# Patient Record
Sex: FEMALE | Race: WHITE | Employment: OTHER | URBAN - METROPOLITAN AREA
[De-identification: names, ages, dates, MRNs, and addresses within clinical notes are randomized per-mention and may not be internally consistent; named-entity substitution may affect disease eponyms.]

---

## 2019-10-19 ENCOUNTER — OFFICE VISIT (OUTPATIENT)
Dept: URGENT CARE | Facility: URGENT CARE | Age: 84
End: 2019-10-19
Payer: MEDICARE

## 2019-10-19 VITALS
SYSTOLIC BLOOD PRESSURE: 172 MMHG | RESPIRATION RATE: 18 BRPM | HEART RATE: 68 BPM | DIASTOLIC BLOOD PRESSURE: 89 MMHG | OXYGEN SATURATION: 95 % | TEMPERATURE: 97.9 F | WEIGHT: 105 LBS

## 2019-10-19 DIAGNOSIS — R06.02 SOB (SHORTNESS OF BREATH): ICD-10-CM

## 2019-10-19 DIAGNOSIS — R94.31 ABNORMAL ELECTROCARDIOGRAM: ICD-10-CM

## 2019-10-19 DIAGNOSIS — R05.9 COUGH: ICD-10-CM

## 2019-10-19 DIAGNOSIS — J44.9 CHRONIC OBSTRUCTIVE PULMONARY DISEASE, UNSPECIFIED COPD TYPE (H): ICD-10-CM

## 2019-10-19 DIAGNOSIS — I25.2 OLD MYOCARDIAL INFARCTION: ICD-10-CM

## 2019-10-19 DIAGNOSIS — R53.83 FATIGUE, UNSPECIFIED TYPE: Primary | ICD-10-CM

## 2019-10-19 DIAGNOSIS — R03.0 ELEVATED BLOOD PRESSURE READING WITHOUT DIAGNOSIS OF HYPERTENSION: ICD-10-CM

## 2019-10-19 LAB
DEPRECATED S PYO AG THROAT QL EIA: NORMAL
SPECIMEN SOURCE: NORMAL

## 2019-10-19 PROCEDURE — 94640 AIRWAY INHALATION TREATMENT: CPT | Performed by: PHYSICIAN ASSISTANT

## 2019-10-19 PROCEDURE — 99205 OFFICE O/P NEW HI 60 MIN: CPT | Mod: 25 | Performed by: PHYSICIAN ASSISTANT

## 2019-10-19 PROCEDURE — 87880 STREP A ASSAY W/OPTIC: CPT | Performed by: PHYSICIAN ASSISTANT

## 2019-10-19 PROCEDURE — 87081 CULTURE SCREEN ONLY: CPT | Performed by: PHYSICIAN ASSISTANT

## 2019-10-19 PROCEDURE — 93000 ELECTROCARDIOGRAM COMPLETE: CPT | Performed by: PHYSICIAN ASSISTANT

## 2019-10-19 RX ORDER — ASPIRIN 81 MG/1
1 TABLET, CHEWABLE ORAL
COMMUNITY

## 2019-10-19 RX ORDER — AZITHROMYCIN 250 MG/1
TABLET, FILM COATED ORAL
Refills: 0 | COMMUNITY
Start: 2019-10-08

## 2019-10-19 RX ORDER — IPRATROPIUM BROMIDE AND ALBUTEROL SULFATE 2.5; .5 MG/3ML; MG/3ML
1 SOLUTION RESPIRATORY (INHALATION) EVERY 4 HOURS PRN
Qty: 30 VIAL | Refills: 1 | Status: SHIPPED | OUTPATIENT
Start: 2019-10-19

## 2019-10-19 RX ORDER — ATORVASTATIN CALCIUM 20 MG/1
20 TABLET, FILM COATED ORAL DAILY
Refills: 1 | COMMUNITY
Start: 2019-07-10

## 2019-10-19 NOTE — PATIENT INSTRUCTIONS
EKG shows  Anteroseptal infarct age undetermined. I do not have actual EKG to compare it to. She did have a MI in the 1990's, note says non Q wave. This could be COPD flare but I can't do troponin or BNP here to rule out others causes.    Duoneb given here.

## 2019-10-19 NOTE — PROGRESS NOTES
S: 83-year-old female presents for evaluation of fatigue, shortness of breath.  She states she has a mild cough.  She states she feels like she has congestion in her chest but nothing is coming up.  She has had a moderate sore throat for the last week.  She has a history of hyperlipidemia and had a normal stress test in 2013.  She has a history of MI, non-Q wave in the 1990s which was treated at [facility].  She had a cardiac catheterization 1989 with normal coronary arteries.  In September 2019 she was seen by cardiology for left arm pain.  The note says she had an EKG that showed normal sinus rhythm with no acute ischemic or arrhythmic changes.  Her arm pain was felt to be musculoskeletal in origin.  No leg swelling or edema.  She states the symptoms bothers her the most is her fatigue and tiredness.    Social-non-smoker    10/7 zpack and 5 days of Prednisone. CXR- no pneumonia, COPD  10/17- CBC, comp met panel.  Given Famotodine.      Allergies not on file    Past medical history-prior MI.  See above  Family medical history-Father with heart failure.  Mother with aneurysm.    No current outpatient medications on file prior to visit.  No current facility-administered medications on file prior to visit.       Social History     Tobacco Use     Smoking status: Not on file   Substance Use Topics     Alcohol use: Not on file       ROS:  CONSTITUTIONAL: Negative for fatigue or fever.  EYES: Negative for eye problems.  ENT: As above.  RESP: As above.  CV: Negative for chest pains.  GI: Negative for vomiting.  MUSCULOSKELETAL:  Negative for significant muscle or joint pains.  NEUROLOGIC: Negative for headaches.  SKIN: Negative for rash.  PSYCH: Normal mentation for age.    OBJECTIVE:  BP (!) 172/89 (BP Location: Left arm, Patient Position: Chair, Cuff Size: Adult Regular)   Pulse 68   Temp 97.9  F (36.6  C) (Oral)   Resp 18   Wt 47.6 kg (105 lb)   SpO2 95%     GENERAL APPEARANCE: Healthy, alert and no  distress.  EYES:Conjunctiva/sclera clear.  EARS: No cerumen.   Ear canals w/o erythema.  TM's intact w/o erythema.    NOSE/MOUTH: Nose without ulcers, erythema or lesions.  SINUSES: No maxillary sinus tenderness.  THROAT: Mild  erythema w/o tonsillar enlargement . No exudates.  NECK: Supple, nontender, no lymphadenopathy.  RESP: Lungs clear to auscultation - no rales, rhonchi or wheezes but tight. After neb much better air movement   CV: Regular rate and rhythm, normal S1 S2, no murmur noted.  NEURO: Awake, alert    SKIN: No rashes abdomen-soft, nontender.    Results for orders placed or performed in visit on 10/19/19   Rapid strep screen   Result Value Ref Range    Specimen Description Throat     Rapid Strep A Screen       NEGATIVE: No Group A streptococcal antigen detected by immunoassay, await culture report.           EXAM: PA and lateral chest x-ray dated 10/7/2019 3:11 PM    CLINICAL DATA: Shortness of breath.    COMPARISON: PA and lateral chest from 9/20/2017.    VIEWS: PA and lateral views of the chest.    FINDINGS: The lungs are clear bilaterally. Lungs are mildly hyperinflated with increased AP diameter. The cardiac silhouette and mediastinal contours are normal. No pleural effusion. There is no pneumothorax. Osteopenia with mild degenerative changes thoracic spine.    IMPRESSION:    No acute pulmonary disease. Hyperinflation consistent with underlying COPD.    REPORT SIGNED BY DR. Navneet Ohara        EKG-rate 63.  I see age-indeterminate anterior septal infarct         ASSESSMENT:     ICD-10-CM    1. Fatigue, unspecified type R53.83 Rapid strep screen     EKG 12-lead complete w/read - Clinics     INHALATION/NEBULIZER TREATMENT, INITIAL     ipratropium - albuterol 0.5 mg/2.5 mg/3 mL (DUONEB) 0.5-2.5 (3) MG/3ML neb solution     Beta strep group A culture   2. SOB (shortness of breath) R06.02 Rapid strep screen     EKG 12-lead complete w/read - Clinics     INHALATION/NEBULIZER TREATMENT, INITIAL      ipratropium - albuterol 0.5 mg/2.5 mg/3 mL (DUONEB) 0.5-2.5 (3) MG/3ML neb solution     Beta strep group A culture     ALBUTEROL/IPRATROPIUM 3ML NEB   3. Chronic obstructive pulmonary disease, unspecified COPD type (H) J44.9    4. Cough R05    5. Elevated blood pressure reading without diagnosis of hypertension R03.0    6. Abnormal electrocardiogram R94.31    7. Old myocardial infarction I25.2            PLAN: This likely is COPD exacerbation.  EKG is abnormal.  However I do not have a prior EKG to compare it to.  She did have an MI in the 90s, notes state non-Q wave.  I am unable to do troponin or BNP here to rule out more serious things.  She feels that the DuoNeb did help here.  She will go to [place] [facility] for further evaluation.  I have discussed clinical findings with patient.  All questions are answered and patient is in agreement with plan.   Patient care instructions are given to at the end of visit.   Lots of rest and fluids.    Kimmy Mccann PA-C

## 2019-10-19 NOTE — PROGRESS NOTES
The following nebulizer treatment was given:     MEDICATION: Duoneb  : [employer]  LOT #: [id number]  EXPIRATION DATE:  05/21  NDC # 4471-7920-69     Nebulizer Start Time:  1058  Nebulizer Stop Time:  1104  See Vital Signs Flowsheet  Clinic Administered Medication Documentation    Inhalable/Nebs Medication Documentation    Patient was given Ipratropium-Albuterol Neb. Prior to medication administration, verified patients identity using patient s name and date of birth. Please see MAR and medication order for additional information.     Gabbi Ibarra, CMA

## 2019-10-20 LAB
BACTERIA SPEC CULT: NORMAL
SPECIMEN SOURCE: NORMAL